# Patient Record
Sex: FEMALE | Race: WHITE
[De-identification: names, ages, dates, MRNs, and addresses within clinical notes are randomized per-mention and may not be internally consistent; named-entity substitution may affect disease eponyms.]

---

## 2017-10-27 PROBLEM — Z00.00 ENCOUNTER FOR PREVENTIVE HEALTH EXAMINATION: Status: ACTIVE | Noted: 2017-10-27

## 2018-01-27 ENCOUNTER — APPOINTMENT (OUTPATIENT)
Dept: OPHTHALMOLOGY | Facility: CLINIC | Age: 63
End: 2018-01-27
Payer: COMMERCIAL

## 2018-01-27 PROCEDURE — 92014 COMPRE OPH EXAM EST PT 1/>: CPT

## 2018-01-27 PROCEDURE — 92083 EXTENDED VISUAL FIELD XM: CPT

## 2018-01-27 PROCEDURE — 92133 CPTRZD OPH DX IMG PST SGM ON: CPT

## 2018-01-27 PROCEDURE — 92015 DETERMINE REFRACTIVE STATE: CPT

## 2018-01-27 PROCEDURE — 92025 CPTRIZED CORNEAL TOPOGRAPHY: CPT

## 2018-02-20 ENCOUNTER — APPOINTMENT (OUTPATIENT)
Dept: OPHTHALMOLOGY | Facility: CLINIC | Age: 63
End: 2018-02-20
Payer: COMMERCIAL

## 2018-02-20 PROCEDURE — 92310 CONTACT LENS FITTING OU: CPT

## 2018-02-20 PROCEDURE — V2521C: CUSTOM

## 2018-07-03 ENCOUNTER — APPOINTMENT (OUTPATIENT)
Dept: OPHTHALMOLOGY | Facility: CLINIC | Age: 63
End: 2018-07-03
Payer: SELF-PAY

## 2018-07-03 PROCEDURE — 92331: CPT | Mod: NC

## 2021-07-06 ENCOUNTER — RESULT REVIEW (OUTPATIENT)
Age: 66
End: 2021-07-06

## 2021-08-07 ENCOUNTER — NON-APPOINTMENT (OUTPATIENT)
Age: 66
End: 2021-08-07

## 2021-08-07 ENCOUNTER — APPOINTMENT (OUTPATIENT)
Dept: OPHTHALMOLOGY | Facility: CLINIC | Age: 66
End: 2021-08-07
Payer: MEDICARE

## 2021-08-07 PROCEDURE — 92004 COMPRE OPH EXAM NEW PT 1/>: CPT

## 2021-08-07 PROCEDURE — 92025 CPTRIZED CORNEAL TOPOGRAPHY: CPT

## 2021-08-07 PROCEDURE — 92015 DETERMINE REFRACTIVE STATE: CPT

## 2021-08-25 ENCOUNTER — APPOINTMENT (OUTPATIENT)
Dept: OPHTHALMOLOGY | Facility: CLINIC | Age: 66
End: 2021-08-25
Payer: SELF-PAY

## 2021-08-25 ENCOUNTER — NON-APPOINTMENT (OUTPATIENT)
Age: 66
End: 2021-08-25

## 2021-08-25 PROCEDURE — 92310 CONTACT LENS FITTING OU: CPT

## 2021-09-01 ENCOUNTER — APPOINTMENT (OUTPATIENT)
Dept: OPHTHALMOLOGY | Facility: CLINIC | Age: 66
End: 2021-09-01

## 2021-09-15 ENCOUNTER — NON-APPOINTMENT (OUTPATIENT)
Age: 66
End: 2021-09-15

## 2021-09-15 ENCOUNTER — APPOINTMENT (OUTPATIENT)
Dept: OPHTHALMOLOGY | Facility: CLINIC | Age: 66
End: 2021-09-15
Payer: MEDICARE

## 2021-09-15 PROCEDURE — 99199 UNLISTED SPECIAL SVC PX/RPRT: CPT | Mod: NC

## 2024-04-08 ENCOUNTER — APPOINTMENT (OUTPATIENT)
Dept: ORTHOPEDIC SURGERY | Facility: CLINIC | Age: 69
End: 2024-04-08

## 2024-07-30 ENCOUNTER — APPOINTMENT (OUTPATIENT)
Dept: ORTHOPEDIC SURGERY | Facility: CLINIC | Age: 69
End: 2024-07-30
Payer: MEDICARE

## 2024-07-30 VITALS — WEIGHT: 108 LBS | HEIGHT: 65 IN | BODY MASS INDEX: 17.99 KG/M2

## 2024-07-30 DIAGNOSIS — G56.22 LESION OF ULNAR NERVE, LEFT UPPER LIMB: ICD-10-CM

## 2024-07-30 DIAGNOSIS — M25.522 PAIN IN LEFT ELBOW: ICD-10-CM

## 2024-07-30 PROCEDURE — 20605 DRAIN/INJ JOINT/BURSA W/O US: CPT | Mod: LT

## 2024-07-30 PROCEDURE — 99203 OFFICE O/P NEW LOW 30 MIN: CPT | Mod: 25

## 2024-07-30 PROCEDURE — 73080 X-RAY EXAM OF ELBOW: CPT | Mod: LT

## 2024-07-30 RX ORDER — METHYLPRED ACET/NACL,ISO-OS/PF 40 MG/ML
40 VIAL (ML) INJECTION
Refills: 0 | Status: COMPLETED | OUTPATIENT
Start: 2024-07-30

## 2024-07-30 RX ADMIN — METHYLPREDNISOLONE ACETATE 1 MG/ML: 40 INJECTION, SUSPENSION INTRA-ARTICULAR; INTRALESIONAL; INTRAMUSCULAR; SOFT TISSUE at 00:00

## 2024-07-31 ENCOUNTER — TRANSCRIPTION ENCOUNTER (OUTPATIENT)
Age: 69
End: 2024-07-31

## 2024-07-31 PROBLEM — G56.22 CUBITAL TUNNEL SYNDROME ON LEFT: Status: ACTIVE | Noted: 2024-07-31

## 2024-07-31 NOTE — DISCUSSION/SUMMARY
[de-identified] : This patient presents today for evaluation regarding left elbow pain that radiates from the elbow down to the forearm and wrist.  Her physical exam and and history consistent with left elbow cubital tunnel syndrome.  I discussed the diagnosis and treatment options.  On today's visit I recommended performed a steroid injection into the cubital tunnel.  Instructions given postinjection for ice analgesics and modification of activities.  I like to see the patient back in the office if the symptoms do not improve or if they recur.  At that point we determine further treatment options such as a possible EMG nerve conduction study.  At least 30 minutes was spent performing the evaluation and management on today's office visit.  This includes but is not limited to preparing to see patient including review of any test results or outside medical records, obtaining and/or reviewing separately obtained history, performing examination and evaluation, counseling and educating the patient on their diagnosis and treatment recommendations, ordering medications, tests, or procedures, documenting clinical information in the electronic health record, independently interpreting results (not separately reported) and communicating results to the patient, and coordination of care.

## 2024-07-31 NOTE — PHYSICAL EXAM
[de-identified] : The patient appears well nourished  and in no apparent distress.  The patient is alert and oriented to person, place, and time.   Affect and mood appear normal.    The head is normocephalic and atraumatic.  The eyes reveal normal sclera and extra ocular muscles are intact.   The neck appears normal with no jugular venous distention or masses noted.   Skin shows normal turgor with no evidence of eczema or psoriasis.  No respiratory distress noted.  The patient ambulates with a normal gait.  The  left elbow has full range of motion in flexion/extension, as well as pronation/supination.  There is some slight discomfort with terminal flexion of the elbow.  There is mild tenderness over the cubital tunnel.  Negative Tinel's sign.  There is no soft tissue swelling.  There is no warmth or erythema.  There is no instability to varus or valgus stress.  Elbow strength is normal.  Strength and sensation are intact distally.    Pulses and capillary refill are normal.    No clubbing, cyanosis, or edema noted.  No lymphadenopathy noted.  [de-identified] : AP,  lateral, and oblique views of the left elbow were obtained.  There is no evidence of fracture, subluxation, or dislocation of the elbow.  The joint spaces are well maintained without evidence of degenerative arthritis.  No osteochondral lesions are noted.   MRI was brought in by the patient of the elbow which was evidence of some increased signal in the ulnar nerve consistent with a possible cubital tunnel syndrome.  Also evidence of some mild lateral epicondylitis.

## 2024-07-31 NOTE — PHYSICAL EXAM
[de-identified] : The patient appears well nourished  and in no apparent distress.  The patient is alert and oriented to person, place, and time.   Affect and mood appear normal.    The head is normocephalic and atraumatic.  The eyes reveal normal sclera and extra ocular muscles are intact.   The neck appears normal with no jugular venous distention or masses noted.   Skin shows normal turgor with no evidence of eczema or psoriasis.  No respiratory distress noted.  The patient ambulates with a normal gait.  The  left elbow has full range of motion in flexion/extension, as well as pronation/supination.  There is some slight discomfort with terminal flexion of the elbow.  There is mild tenderness over the cubital tunnel.  Negative Tinel's sign.  There is no soft tissue swelling.  There is no warmth or erythema.  There is no instability to varus or valgus stress.  Elbow strength is normal.  Strength and sensation are intact distally.    Pulses and capillary refill are normal.    No clubbing, cyanosis, or edema noted.  No lymphadenopathy noted.  [de-identified] : AP,  lateral, and oblique views of the left elbow were obtained.  There is no evidence of fracture, subluxation, or dislocation of the elbow.  The joint spaces are well maintained without evidence of degenerative arthritis.  No osteochondral lesions are noted.   MRI was brought in by the patient of the elbow which was evidence of some increased signal in the ulnar nerve consistent with a possible cubital tunnel syndrome.  Also evidence of some mild lateral epicondylitis.

## 2024-07-31 NOTE — DISCUSSION/SUMMARY
[de-identified] : This patient presents today for evaluation regarding left elbow pain that radiates from the elbow down to the forearm and wrist.  Her physical exam and and history consistent with left elbow cubital tunnel syndrome.  I discussed the diagnosis and treatment options.  On today's visit I recommended performed a steroid injection into the cubital tunnel.  Instructions given postinjection for ice analgesics and modification of activities.  I like to see the patient back in the office if the symptoms do not improve or if they recur.  At that point we determine further treatment options such as a possible EMG nerve conduction study.  At least 30 minutes was spent performing the evaluation and management on today's office visit.  This includes but is not limited to preparing to see patient including review of any test results or outside medical records, obtaining and/or reviewing separately obtained history, performing examination and evaluation, counseling and educating the patient on their diagnosis and treatment recommendations, ordering medications, tests, or procedures, documenting clinical information in the electronic health record, independently interpreting results (not separately reported) and communicating results to the patient, and coordination of care.

## 2024-07-31 NOTE — HISTORY OF PRESENT ILLNESS
[de-identified] : This patient presents today complaining of left elbow pain going for at least 6 months.  She does a history of multiple sclerosis.  Currently she has no pain.  She does note pain that radiates from the elbow down to the forearm into the little finger.  She is taking a lot of gabapentin.  She denies any injury to the elbow.  She has problems with activities which cause significant pain in the elbow and the forearm.  She presents today for evaluation regarding these continued symptoms.

## 2024-07-31 NOTE — HISTORY OF PRESENT ILLNESS
[de-identified] : This patient presents today complaining of left elbow pain going for at least 6 months.  She does a history of multiple sclerosis.  Currently she has no pain.  She does note pain that radiates from the elbow down to the forearm into the little finger.  She is taking a lot of gabapentin.  She denies any injury to the elbow.  She has problems with activities which cause significant pain in the elbow and the forearm.  She presents today for evaluation regarding these continued symptoms.

## 2024-07-31 NOTE — PROCEDURE
[de-identified] : On today's visit under sterile conditions we injected 1 cc of Depo-Medrol into the cubital tunnel.  A dry sterile compressive dressing was placed.  Patient was given instructions postinjection for ice analgesics and modification of activities.

## 2024-07-31 NOTE — PROCEDURE
[de-identified] : On today's visit under sterile conditions we injected 1 cc of Depo-Medrol into the cubital tunnel.  A dry sterile compressive dressing was placed.  Patient was given instructions postinjection for ice analgesics and modification of activities.

## 2024-08-14 ENCOUNTER — APPOINTMENT (OUTPATIENT)
Dept: ORTHOPEDIC SURGERY | Facility: CLINIC | Age: 69
End: 2024-08-14
Payer: MEDICARE

## 2024-08-14 DIAGNOSIS — G56.22 LESION OF ULNAR NERVE, LEFT UPPER LIMB: ICD-10-CM

## 2024-08-14 PROCEDURE — 99214 OFFICE O/P EST MOD 30 MIN: CPT

## 2024-08-14 NOTE — PHYSICAL EXAM
[de-identified] : - Constitutional: This is a female in no obvious distress. She is accompanied by her friend. - Psych: Patient is alert and oriented to person, place and time.  Patient has a normal mood and affect.  ---  Examination of her left elbow demonstrates no obvious swelling.  She has tenderness along the ulnar nerve at the cubital tunnel.  She has a positive Tinel with a negative flexion sign.  There is no instability of the ulnar nerve with flexion and extension of the elbow.  Provocative signs for carpal tunnel syndrome are negative.  She has intact sensation to light touch distally along the radial, ulnar and median nerve distributions.  There is no weakness of the ulnar nerve innervated intrinsics and there is no obvious intrinsic atrophy.  [de-identified] : An MRI report of the left elbow dated 1/22/2024 which demonstrated mild lateral epicondylitis, without tear and findings consistent with ulnar neuritis/cubital tunnel syndrome.  The films were not available for my review.

## 2024-08-14 NOTE — ADDENDUM
[FreeTextEntry1] :  I, Julio Valladares, acted solely as a scribe for Dr. Cesar on this date on 08/14/2024.

## 2024-08-14 NOTE — END OF VISIT
[FreeTextEntry3] : This note was written by Julio Valladares on 08/14/2024 acting solely as a scribe for Dr. Lobito Cesar.   All medical record entries made by the Scribe were at my, Dr. Lobito Cesar, direction and personally dictated by me on 08/14/2024. I have personally reviewed the chart and agree that the record accurately reflects my personal performance of the history, physical exam, assessment and plan.

## 2024-08-14 NOTE — DISCUSSION/SUMMARY
[FreeTextEntry1] : She has findings consistent with chronic left cubital tunnel syndrome.  She had a cortisone injection by Dr. Matthews 15 days ago without relief.   I had a discussion with the patient regarding today's visit, the prognosis of this diagnosis, and treatment recommendations and options. At this time, I discussed treatment options of observation or surgical management. We briefly discussed the option of surgical management, I did tell her that here is no guarantee that it would fully provide her symptoms with relief. She defers surgical management at this time and would like time to consider surgical management.  I did tell her that the only time I have to schedule her surgery before my own surgery would be in 16 days.  She states that that does not work for her.  Therefore, she was referred to Dr. Love for surgical consultation, if she would like to proceed with surgery sometime in September.    The patient has agreed to the above plan of management and has expressed full understanding. All questions were fully answered to the patient's satisfaction.   My cumulative time spent on this visit was greater than 45 minutes included: Preparation for the visit, review of the medical records, review of pertinent diagnostic studies, examination and counseling of the patient on the above diagnosis, treatment plan and prognosis, orders of diagnostic tests, medication and/or appropriate procedures and documentation in the medical records of today's visit.

## 2024-08-14 NOTE — HISTORY OF PRESENT ILLNESS
[Left] : left hand dominant [FreeTextEntry1] : She comes in today for evaluation of left elbow pain for the past year. She rates her pain as a 10/10. She states that she has a cold sensation in her hand. She states that she had an MRI done of her left elbow.    She was given a cortisone injection at her left elbow by Dr. Matthews 15 days ago. She states that her symptoms did not improve since having the injection.   She has a medical history of MS and trigeminal neuralgia.   I reviewed EMGs dated 5/24/2024 which demonstrated no evidence of cervical radiculopathy. 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 There is also evidence of left median sensory neuropathy at the wrist, as in mild carpal tunnel.   She is accompanied by her friend.

## 2024-08-26 ENCOUNTER — APPOINTMENT (OUTPATIENT)
Dept: ORTHOPEDIC SURGERY | Facility: CLINIC | Age: 69
End: 2024-08-26

## 2025-04-01 ENCOUNTER — APPOINTMENT (OUTPATIENT)
Facility: CLINIC | Age: 70
End: 2025-04-01
Payer: MEDICARE

## 2025-04-01 ENCOUNTER — NON-APPOINTMENT (OUTPATIENT)
Age: 70
End: 2025-04-01

## 2025-04-01 VITALS — SYSTOLIC BLOOD PRESSURE: 120 MMHG | DIASTOLIC BLOOD PRESSURE: 74 MMHG

## 2025-04-01 DIAGNOSIS — Z80.9 FAMILY HISTORY OF MALIGNANT NEOPLASM, UNSPECIFIED: ICD-10-CM

## 2025-04-01 DIAGNOSIS — Z63.5 DISRUPTION OF FAMILY BY SEPARATION AND DIVORCE: ICD-10-CM

## 2025-04-01 DIAGNOSIS — Z92.89 PERSONAL HISTORY OF OTHER MEDICAL TREATMENT: ICD-10-CM

## 2025-04-01 PROCEDURE — G0101: CPT

## 2025-04-01 SDOH — SOCIAL STABILITY - SOCIAL INSECURITY: DISRUPTION OF FAMILY BY SEPARATION AND DIVORCE: Z63.5
